# Patient Record
Sex: FEMALE | Race: BLACK OR AFRICAN AMERICAN | NOT HISPANIC OR LATINO | Employment: UNEMPLOYED | URBAN - METROPOLITAN AREA
[De-identification: names, ages, dates, MRNs, and addresses within clinical notes are randomized per-mention and may not be internally consistent; named-entity substitution may affect disease eponyms.]

---

## 2021-05-29 ENCOUNTER — RECORDS - HEALTHEAST (OUTPATIENT)
Dept: ADMINISTRATIVE | Facility: CLINIC | Age: 36
End: 2021-05-29

## 2022-09-28 ENCOUNTER — TELEPHONE (OUTPATIENT)
Dept: EMERGENCY MEDICINE | Facility: CLINIC | Age: 37
End: 2022-09-28

## 2022-09-28 ENCOUNTER — HOSPITAL ENCOUNTER (EMERGENCY)
Facility: CLINIC | Age: 37
Discharge: HOME | End: 2022-09-28
Attending: EMERGENCY MEDICINE | Admitting: EMERGENCY MEDICINE
Payer: COMMERCIAL

## 2022-09-28 VITALS
BODY MASS INDEX: 21.35 KG/M2 | RESPIRATION RATE: 16 BRPM | SYSTOLIC BLOOD PRESSURE: 116 MMHG | HEIGHT: 67 IN | OXYGEN SATURATION: 98 % | WEIGHT: 136 LBS | DIASTOLIC BLOOD PRESSURE: 74 MMHG | HEART RATE: 109 BPM | TEMPERATURE: 98.3 F

## 2022-09-28 DIAGNOSIS — J02.0 STREPTOCOCCAL PHARYNGITIS: ICD-10-CM

## 2022-09-28 DIAGNOSIS — J02.9 PHARYNGITIS, UNSPECIFIED ETIOLOGY: ICD-10-CM

## 2022-09-28 LAB
DEPRECATED S PYO AG THROAT QL EIA: NEGATIVE
FLUAV RNA SPEC QL NAA+PROBE: NEGATIVE
FLUBV RNA RESP QL NAA+PROBE: NEGATIVE
GROUP A STREP BY PCR: DETECTED
RSV RNA SPEC NAA+PROBE: NEGATIVE
SARS-COV-2 RNA RESP QL NAA+PROBE: NEGATIVE

## 2022-09-28 PROCEDURE — 99283 EMERGENCY DEPT VISIT LOW MDM: CPT

## 2022-09-28 PROCEDURE — 250N000009 HC RX 250: Performed by: EMERGENCY MEDICINE

## 2022-09-28 PROCEDURE — 87651 STREP A DNA AMP PROBE: CPT | Performed by: EMERGENCY MEDICINE

## 2022-09-28 PROCEDURE — 250N000013 HC RX MED GY IP 250 OP 250 PS 637: Performed by: EMERGENCY MEDICINE

## 2022-09-28 PROCEDURE — 87637 SARSCOV2&INF A&B&RSV AMP PRB: CPT | Performed by: EMERGENCY MEDICINE

## 2022-09-28 PROCEDURE — C9803 HOPD COVID-19 SPEC COLLECT: HCPCS

## 2022-09-28 RX ADMIN — LIDOCAINE HYDROCHLORIDE 30 ML: 20 SOLUTION ORAL; TOPICAL at 10:53

## 2022-09-28 ASSESSMENT — ENCOUNTER SYMPTOMS
SORE THROAT: 1
NAUSEA: 0
SHORTNESS OF BREATH: 0
FEVER: 0
ABDOMINAL PAIN: 0

## 2022-09-28 ASSESSMENT — ACTIVITIES OF DAILY LIVING (ADL): ADLS_ACUITY_SCORE: 35

## 2022-09-28 NOTE — TELEPHONE ENCOUNTER
[facility]    [place] ED lab result protocol used  Group A strep    Reason for call  Notify of lab results, assess symptoms,  review ED providers recommendations/discharge instructions (if necessary) and advise per ED lab result f/u protocol    Lab Result (including Rx patient on, if applicable)  Group A Streptococcus PCR is POSITIVE.  [facility] Emergency Dept discharge antibiotic: None  Recommendations in Treatment per [facility] ED Lab Result Strep group A protocol.     Information table from Emergency Dept Provider visit on 9/28/22  Symptoms reported at ED visit (Chief complaint, HPI) Chief Complaint:  Pharyngitis        The history is provided by the patient.      Ananya Valenzuela is a 37 year old female with history of asthma who presents with 3 days of a sore throat. The patient states that it is so painful to swallow that she has been unable to eat. She states that she has been able to drink a small amount, and she denies any nausea or issues keeping fluids down. She denies taking any daily medications or having any allergies.    Significant Medical hx, if applicable (i.e. CKD, diabetes) Reviewed   Allergies No Known Allergies   Weight, if applicable Wt Readings from Last 2 Encounters:   09/28/22 61.7 kg (136 lb)      Coumadin/Warfarin [Yes /No] No   Creatinine Level (mg/dl) No results found for: CR   Creatinine clearance (ml/min), if applicable Creatinine clearance cannot be calculated (No successful lab value found.)   Pregnant (Yes/No/NA) ?   Breastfeeding (Yes/No/NA) ?   ED providers Impression and Plan (applicable information) Ananya Valenzuela is a 37 year old female who presents with sore throat and clinical evidence of pharyngitis.  The rapid strep test is negative, and formal culture has been set up in the lab. There is no clinical evidence of peritonsillar abscess, retropharyngeal abscess, Lemierre's Syndrome, epiglottis, or  Flip's angina. The etiology is most likely viral.   I have recommended treatment with analgesics, and we will await formal culture results.  Return if increasing pain, change in voice, neck pain, vomiting, fever, or shortness of breath. Follow-up with primary physician if not improving in 3-5 days. Given her well appearance, I would not test further for other etiologies of serious bacterial infections.  COVID testing negative.  Presentation most consistent with viral pharyngitis.  Tolerating oral intake prior to discharge.  Recommended Tylenol/ibuprofen as needed for pain control.      ED diagnosis Pharyngitis, unspecified etiology    ED provider Nitin Amos, DO        RN Assessment (Patient s current Symptoms), include time called.  [Insert Left message here if message left]  2:12p  Left voicemail message requesting a call back to [facility] ED Lab Result RN at [phone].  RN is available every day between 9 a.m. and 5:30 p.m.  See Telephone encounter.      Kera Villela RN  [facility] [employer] [place]  Emergency Dept Lab Result RN  Ph# [phone]     Copy of Lab result

## 2022-09-28 NOTE — ED PROVIDER NOTES
"  History   Chief Complaint:  Pharyngitis       The history is provided by the patient.      Ananya Valenzuela is a 37 year old female with history of asthma who presents with 3 days of a sore throat. The patient states that it is so painful to swallow that she has been unable to eat. She states that she has been able to drink a small amount, and she denies any nausea or issues keeping fluids down. She denies taking any daily medications or having any allergies.     Review of Systems   Constitutional: Negative for fever.   HENT: Positive for sore throat.    Respiratory: Negative for shortness of breath.    Cardiovascular: Negative for chest pain.   Gastrointestinal: Negative for abdominal pain and nausea.   All other systems reviewed and are negative.    Allergies:  The patient has no known allergies.     Medications:  The patient is currently on no regular medications.    Past Medical History:     Asthma  DVT (deep vein thrombosis)  Prothrombin mutation  Allergic rhinitis  PE (pulmonary embolism)  STD (sexually transmitted disease)   Grief at loss of child  Adjustment reaction with anxiety and depression  PTSD (post-traumatic stress disorder)  Homelessness    Past Surgical History:    Somers teeth extraction    Family History:    Mother: alcohol abuse    Social History:  The patient presents to the ED unaccompanied  Has four children    Physical Exam     Patient Vitals for the past 24 hrs:   BP Temp Temp src Pulse Resp SpO2 Height Weight   09/28/22 1011 -- -- -- 109 -- 98 % -- --   09/28/22 0942 116/74 98.3  F (36.8  C) Oral (!) 129 16 96 % 1.702 m (5' 7\") 61.7 kg (136 lb)       Physical Exam  General: Patient in mild distress.  Alert and cooperative with exam. Normal mentation.  Nontoxic appearance  HEENT: Moderate posterior oropharynx erythema. Uvula midline, no evidence of deep space infection or significant posterior oropharynx edema/swelling.  Mild bilateral cervical lymphadenopathy. NC/AT. Conjunctiva without " injection or scleral icterus. External ears normal.  Respiratory: Breathing comfortably on room air  CV: Mildly tachycardic rate, regular rhythm, all extremities well perfused  GI:  Non-distended abdomen  Skin: Warm, dry, no rashes/open wounds on exposed skin  Musculoskeletal: No obvious deformities  Neuro: Alert, answers questions appropriately. No gross motor deficits    Emergency Department Course     Laboratory:  Labs Ordered and Resulted from Time of ED Arrival to Time of ED Departure   INFLUENZA A/B & SARS-COV2 PCR MULTIPLEX - Normal       Result Value    Influenza A PCR Negative      Influenza B PCR Negative      RSV PCR Negative      SARS CoV2 PCR Negative     STREPTOCOCCUS A RAPID SCREEN W REFELX TO PCR - Normal    Group A Strep antigen Negative     GROUP A STREPTOCOCCUS PCR THROAT SWAB      Emergency Department Course:           Reviewed:  I reviewed nursing notes, vitals, past medical history and Care Everywhere    Assessments:  1035 I obtained history and examined the patient as noted above.   1115 I rechecked the patient and explained findings.     Interventions:  1053 GI Cocktail (Maalox/Mylanta and viscous Lidocaine), 30 mL suspension, PO     Disposition:  The patient was discharged to home.     Impression & Plan     Medical Decision Making:  Ananya Valenzuela is a 37 year old female who presents with sore throat and clinical evidence of pharyngitis.  The rapid strep test is negative, and formal culture has been set up in the lab. There is no clinical evidence of peritonsillar abscess, retropharyngeal abscess, Lemierre's Syndrome, epiglottis, or Flip's angina. The etiology is most likely viral.   I have recommended treatment with analgesics, and we will await formal culture results.  Return if increasing pain, change in voice, neck pain, vomiting, fever, or shortness of breath. Follow-up with primary physician if not improving in 3-5 days. Given her well appearance, I would not test further for other  etiologies of serious bacterial infections.  COVID testing negative.  Presentation most consistent with viral pharyngitis.  Tolerating oral intake prior to discharge.  Recommended Tylenol/ibuprofen as needed for pain control.    Covid-19  Ananya Valenzuela was evaluated during a global COVID-19 pandemic, which necessitated consideration that the patient might be at risk for infection with the SARS-CoV-2 virus that causes COVID-19.   Applicable protocols for evaluation were followed during the patient's care.   COVID-19 was considered as part of the patient's evaluation. The plan for testing is:  a test was obtained during this visit.    Diagnosis:    ICD-10-CM    1. Pharyngitis, unspecified etiology  J02.9      Scribe Disclosure:  Dulce GUNTER, am serving as a scribe at 10:37 AM on 9/28/2022 to document services personally performed by Nitin Amos DO* based on my observations and the provider's statements to me.              Nitin Amos DO  09/28/22 1146

## 2022-09-28 NOTE — ED TRIAGE NOTES
Sore throat for 3 days with right tonsillar swelling, having difficulty swallowing food.       Triage Assessment     Row Name 09/28/22 0960       Triage Assessment (Adult)    Airway WDL WDL       Respiratory WDL    Respiratory WDL WDL       Skin Circulation/Temperature WDL    Skin Circulation/Temperature WDL WDL       Cardiac WDL    Cardiac WDL WDL       Peripheral/Neurovascular WDL    Peripheral Neurovascular WDL WDL       Cognitive/Neuro/Behavioral WDL    Cognitive/Neuro/Behavioral WDL WDL

## 2022-09-28 NOTE — DISCHARGE INSTRUCTIONS
Return to the emergency department or seek medical care as instructed if your symptoms fail to improve or significantly worsen.    Take Acetaminophen (aka Tylenol)  and/or ibuprofen (aka Motrin/Advil, 600mg up to 4 times per day with food) as needed for symptom/pain relief; use as directed.    Follow-up as indicated on page 1.  Maintain adequate hydration and get plenty of rest.

## 2022-09-29 RX ORDER — PENICILLIN V POTASSIUM 500 MG/1
500 TABLET, FILM COATED ORAL 2 TIMES DAILY
Qty: 20 TABLET | Refills: 0 | Status: SHIPPED | OUTPATIENT
Start: 2022-09-29 | End: 2022-10-09

## 2022-09-29 NOTE — TELEPHONE ENCOUNTER
[facility] Emergency Department Lab result notification     [place] ED lab result protocol used  Group A strep     Reason for call  Notify of lab results, assess symptoms,  review ED providers recommendations/discharge instructions (if necessary) and advise per ED lab result f/u protocol     Lab Result (including Rx patient on, if applicable)  Group A Streptococcus PCR is POSITIVE.  [facility] Emergency Dept discharge antibiotic: None  Recommendations in Treatment per [facility] ED Lab Result Strep group A protocol.     RN Assessment (Patient s current Symptoms), include time called.  [Insert Left message here if message left]  1:21PM: Still has a sore throat. Is eating and drinking better, swallowing without difficulty.     RN Recommendations/Instructions per [place] ED lab result protocol  Patient notified of lab result and treatment recommendations.  Rx for Penicillin V Potassium (VEETID) 500 MG tablet,  1 tablet (500 mg) by mouth 2 times daily for 10 days sent to [Pharmacy - [facility] in [employer] in [facility]].  RN reviewed information about Strep throat from protocol RN reference guide.  Advised that she is considered contagious until she has been on the antibiotic for 24 hours and to finish the full course of the antibiotic as prescribed.   The patient is comfortable with the information given and has no further questions.      Please Contact your PCP clinic or return to the Emergency department if your:    Symptoms do not resolve after completing antibiotic.    Symptoms worsen or other concerning symptom's    Addie Moya RN  [facility] [employer] [place]  Emergency Dept Lab Result RN  Ph# [phone]